# Patient Record
Sex: FEMALE | Race: BLACK OR AFRICAN AMERICAN | URBAN - METROPOLITAN AREA
[De-identification: names, ages, dates, MRNs, and addresses within clinical notes are randomized per-mention and may not be internally consistent; named-entity substitution may affect disease eponyms.]

---

## 2024-02-02 ENCOUNTER — OV NP (OUTPATIENT)
Dept: URBAN - METROPOLITAN AREA CLINIC 74 | Facility: CLINIC | Age: 89
End: 2024-02-02
Payer: COMMERCIAL

## 2024-02-02 ENCOUNTER — LAB (OUTPATIENT)
Dept: URBAN - METROPOLITAN AREA CLINIC 74 | Facility: CLINIC | Age: 89
End: 2024-02-02

## 2024-02-02 VITALS
SYSTOLIC BLOOD PRESSURE: 144 MMHG | HEART RATE: 83 BPM | WEIGHT: 176 LBS | DIASTOLIC BLOOD PRESSURE: 90 MMHG | BODY MASS INDEX: 32.39 KG/M2 | TEMPERATURE: 97.3 F | HEIGHT: 62 IN

## 2024-02-02 DIAGNOSIS — K92.1 HEMATOCHEZIA: ICD-10-CM

## 2024-02-02 PROCEDURE — 99202 OFFICE O/P NEW SF 15 MIN: CPT | Performed by: PHYSICIAN ASSISTANT

## 2024-02-02 RX ORDER — AMLODIPINE BESYLATE AND BENAZEPRIL HYDROCHLORIDE 10; 20 MG/1; MG/1
AS DIRECTED CAPSULE ORAL
Status: ACTIVE | COMMUNITY

## 2024-02-02 NOTE — HPI-TODAY'S VISIT:
The patient is 88-year-old female with past medical history as noted below is presenting to our clinic today with abdominal pain. The patient states that she is visiting her daughter and son from [place]. She states that she had a large bowel movements with large blood clot on Tuesday. Since then she has had multiple bowel movements with very minimal blood in stools and on toilet tissues bur no rectal bleeding since yesterday. She has brown color stools. She also complains of some lightheadedness. No other GI issues today. Last Colonoscopy was 15 years ago. No history of colon polyps. No previous history of GI disorder or hematochezia.     -- The patient denies dyspepsia, dysphagia, odynophagia, hemoptysis, hematemesis, nausea, vomiting, regurgitation, melena, constipation, diarrhea, abdominal pain, hematochezia, fever, chills, chest pain, SOB, or any other GI complaints today.   -- The patient denies ETOH, Tobacco, and Illicit drug use.   -- The patient is up to date with COVID vaccine.  --  Denies NSAID's.

## 2024-02-03 LAB
A/G RATIO: 1.5
ABSOLUTE BASOPHILS: 42
ABSOLUTE EOSINOPHILS: 150
ABSOLUTE LYMPHOCYTES: 2250
ABSOLUTE MONOCYTES: 672
ABSOLUTE NEUTROPHILS: 2886
ALBUMIN: 4.2
ALKALINE PHOSPHATASE: 67
ALT (SGPT): 13
AST (SGOT): 20
BASOPHILS: 0.7
BILIRUBIN, TOTAL: 0.5
BUN/CREATININE RATIO: (no result)
BUN: 20
CALCIUM: 9.6
CARBON DIOXIDE, TOTAL: 27
CHLORIDE: 104
CREATININE: 0.87
EGFR: 65
EOSINOPHILS: 2.5
GLOBULIN, TOTAL: 2.8
GLUCOSE: 87
HEMATOCRIT: 38.1
HEMOGLOBIN: 12.9
LYMPHOCYTES: 37.5
MCH: 27.7
MCHC: 33.9
MCV: 81.9
MONOCYTES: 11.2
MPV: 10.7
NEUTROPHILS: 48.1
PLATELET COUNT: 214
POTASSIUM: 4.2
PROTEIN, TOTAL: 7
RDW: 12.6
RED BLOOD CELL COUNT: 4.65
SODIUM: 140
WHITE BLOOD CELL COUNT: 6

## 2024-02-05 ENCOUNTER — OV NP (OUTPATIENT)
Dept: URBAN - METROPOLITAN AREA CLINIC 2 | Facility: CLINIC | Age: 89
End: 2024-02-05

## 2024-02-09 ENCOUNTER — OV EP (OUTPATIENT)
Dept: URBAN - METROPOLITAN AREA CLINIC 74 | Facility: CLINIC | Age: 89
End: 2024-02-09
Payer: COMMERCIAL

## 2024-02-09 VITALS
HEART RATE: 82 BPM | BODY MASS INDEX: 32.72 KG/M2 | SYSTOLIC BLOOD PRESSURE: 132 MMHG | TEMPERATURE: 97.6 F | WEIGHT: 177.8 LBS | DIASTOLIC BLOOD PRESSURE: 82 MMHG | HEIGHT: 62 IN

## 2024-02-09 DIAGNOSIS — R15.1 FECAL SMEARING: ICD-10-CM

## 2024-02-09 DIAGNOSIS — R19.4 CHANGE IN BOWEL HABITS: ICD-10-CM

## 2024-02-09 DIAGNOSIS — K57.90 DIVERTICULOSIS: ICD-10-CM

## 2024-02-09 DIAGNOSIS — N28.1 RENAL CYST: ICD-10-CM

## 2024-02-09 PROBLEM — 397881000: Status: ACTIVE | Noted: 2024-02-09

## 2024-02-09 PROCEDURE — 99212 OFFICE O/P EST SF 10 MIN: CPT | Performed by: PHYSICIAN ASSISTANT

## 2024-02-09 RX ORDER — AMLODIPINE BESYLATE AND BENAZEPRIL HYDROCHLORIDE 10; 20 MG/1; MG/1
AS DIRECTED CAPSULE ORAL
Status: ACTIVE | COMMUNITY

## 2024-02-09 NOTE — HPI-TODAY'S VISIT:
The patient is 88-year-old female with past medical history as noted below is presenting to our  clinic today to discuss her CT and lab results. She has noted lower abdominal pain with change in bowel habits. She has been using Miralax daily. She started with some improvement. No more rectal bleeding. She had only one time prior to her last visit. She continues with constipation with minimal improvement. She denies any GI complaints today.     -- The patient denies dyspepsia, dysphagia, odynophagia, hemoptysis, hematemesis, nausea, vomiting, regurgitation, melena, diarrhea, abdominal pain, hematochezia, fever, chills, chest pain, SOB, or any other GI complaints today.   -- The patient denies ETOH, Tobacco, and Illicit drug use.   -- The patient is up to date with COVID vaccine.  --  Denies NSAID's.  Diagnostic studies: -- CT on 02/07/2024 with no acute abnormality identified.  Small hiatal hernia.  Chronic diverticulosis without acute diverticulitis.  Status postcholecystectomy and hysterectomy.  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  Liver, pancreas, spleen, and adrenal glands are normal.  -- Labs on 02/02/2024 CBC and CMP unremarkable.

## 2024-02-15 ENCOUNTER — OV EP (OUTPATIENT)
Dept: URBAN - METROPOLITAN AREA CLINIC 74 | Facility: CLINIC | Age: 89
End: 2024-02-15

## 2024-02-15 RX ORDER — AMLODIPINE BESYLATE AND BENAZEPRIL HYDROCHLORIDE 10; 20 MG/1; MG/1
AS DIRECTED CAPSULE ORAL
Status: ACTIVE | COMMUNITY

## 2024-02-15 NOTE — HPI-TODAY'S VISIT:
The patient is 88-year-old female with past medical history as noted below is presenting to our clinic today to discuss her labs and imaging. She has been using Miralax for constipation and no further rectal bleeding.     -- The patient denies dyspepsia, dysphagia, odynophagia, hemoptysis, hematemesis, nausea, vomiting, regurgitation, melena, constipation, diarrhea, abdominal pain, hematochezia, fever, chills, chest pain, SOB, or any other GI complaints today.   -- The patient denies ETOH, Tobacco, and Illicit drug use.   -- The patient is up to date with COVID vaccine.  --  Denies NSAID's.  Diagnostic studies: -- Labs on 02/02/2024 CBC and CMP are normal.

## 2024-02-16 ENCOUNTER — OV EP (OUTPATIENT)
Dept: URBAN - METROPOLITAN AREA CLINIC 74 | Facility: CLINIC | Age: 89
End: 2024-02-16